# Patient Record
Sex: MALE | Race: WHITE | NOT HISPANIC OR LATINO | Employment: OTHER | URBAN - METROPOLITAN AREA
[De-identification: names, ages, dates, MRNs, and addresses within clinical notes are randomized per-mention and may not be internally consistent; named-entity substitution may affect disease eponyms.]

---

## 2021-01-30 ENCOUNTER — APPOINTMENT (OUTPATIENT)
Dept: RADIOLOGY | Facility: CLINIC | Age: 41
End: 2021-01-30
Payer: COMMERCIAL

## 2021-01-30 ENCOUNTER — OFFICE VISIT (OUTPATIENT)
Dept: URGENT CARE | Facility: CLINIC | Age: 41
End: 2021-01-30
Payer: COMMERCIAL

## 2021-01-30 VITALS
HEIGHT: 67 IN | OXYGEN SATURATION: 99 % | RESPIRATION RATE: 18 BRPM | BODY MASS INDEX: 21.97 KG/M2 | TEMPERATURE: 97.2 F | SYSTOLIC BLOOD PRESSURE: 130 MMHG | WEIGHT: 140 LBS | DIASTOLIC BLOOD PRESSURE: 78 MMHG | HEART RATE: 56 BPM

## 2021-01-30 DIAGNOSIS — S69.92XA INJURY OF LEFT THUMB, INITIAL ENCOUNTER: ICD-10-CM

## 2021-01-30 DIAGNOSIS — S62.515A CLOSED NONDISPLACED FRACTURE OF PROXIMAL PHALANX OF LEFT THUMB, INITIAL ENCOUNTER: Primary | ICD-10-CM

## 2021-01-30 PROCEDURE — 99202 OFFICE O/P NEW SF 15 MIN: CPT | Performed by: PHYSICIAN ASSISTANT

## 2021-01-30 PROCEDURE — 29125 APPL SHORT ARM SPLINT STATIC: CPT | Performed by: PHYSICIAN ASSISTANT

## 2021-01-30 PROCEDURE — 73140 X-RAY EXAM OF FINGER(S): CPT

## 2021-01-30 NOTE — PATIENT INSTRUCTIONS
Thumb Fracture   WHAT YOU NEED TO KNOW:   A thumb fracture is a break in a bone in your thumb  DISCHARGE INSTRUCTIONS:   Return to the emergency department if:   · Your cast cracks or breaks  · You are not able to move your fingers  · You have severe pain in your thumb or hand  · You have new or increased swelling in your thumb or hand  · Your cast feels too tight  · Your injured thumb is numb  · Your skin under the cast or splint burns or stings  Call your doctor or hand specialist if:   · The skin around your cast becomes red and sore  · The skin under your cast or splint itches, and the itch does not go away  · You have questions or concerns about your condition or care  Medicines:   · Prescription pain medicine  may be given  Ask your healthcare provider how to take this medicine safely  Some prescription pain medicines contain acetaminophen  Do not take other medicines that contain acetaminophen without talking to your healthcare provider  Too much acetaminophen may cause liver damage  Prescription pain medicine may cause constipation  Ask your healthcare provider how to prevent or treat constipation  · Take your medicine as directed  Contact your healthcare provider if you think your medicine is not helping or if you have side effects  Tell him or her if you are allergic to any medicine  Keep a list of the medicines, vitamins, and herbs you take  Include the amounts, and when and why you take them  Bring the list or the pill bottles to follow-up visits  Carry your medicine list with you in case of an emergency  Self-care:   · Apply ice on your thumb  to help decrease swelling and pain  Ice may also help prevent tissue damage  Use an ice pack, or put crushed ice in a plastic bag  Cover it with a towel before you place it on your thumb, splint, or cast  Apply ice for 15 to 20 minutes every hour, or as directed      · Elevate your thumb  above the level of your heart as often as you can  This will help decrease swelling and pain  Prop your hand on pillows or blankets to keep your thumb elevated comfortably  · Check the skin around your cast or splint daily  for red or sore areas  · Go to a hand therapist,  if recommended  Your healthcare provider may recommend this after your cast or splint is removed  A hand therapist can help you with exercises to strengthen your hand and help restore movement  Cast or splint care:   · Keep the cast or splint dry  Cover as directed when you need to shower  · Do not stick objects inside to scratch an itch  · Do not pull the padding out  · Keep dirt, sand, and powder out  Follow up with your doctor or hand specialist as directed: You may need x-rays of your thumb to check the position as it heals  Your cast may need to be removed after 2 to 3 weeks to check any wounds  Write down your questions so you remember to ask them during your visits  © Copyright [address] Information is for End User's use only and may not be sold, redistributed or otherwise used for commercial purposes  All illustrations and images included in CareNotes® are the copyrighted property of A D A M , Inc  or [facility] Venita Romo   The above information is an  only  It is not intended as medical advice for individual conditions or treatments  Talk to your doctor, nurse or pharmacist before following any medical regimen to see if it is safe and effective for you

## 2021-01-30 NOTE — PROGRESS NOTES
[facility]        NAME: Elsie Gilman is a 38 y o  male  : 1980    MRN: [id number]  DATE: 2021  TIME: 11:19 AM    Assessment and Plan   Closed nondisplaced fracture of proximal phalanx of left thumb, initial encounter [[id number]]  1  Closed nondisplaced fracture of proximal phalanx of left thumb, initial encounter  Ambulatory referral to Orthopedic Surgery   2  Injury of left thumb, initial encounter  XR thumb left first digit-min 2v         Patient Instructions       Follow up with PCP in 3-5 days  Proceed to  ER if symptoms worsen  Chief Complaint     Chief Complaint   Patient presents with    Thumb Pain     left x 1 day         History of Present Illness       Patient injured his left thumb when he went to left upper frame fell back down injuring his left thumb  He has lot of pain swelling and ecchymosis over the proximal phalanx  Has limited movement of motion at the interphalangeal joint  He denies any numbness or tingling  Review of Systems   Review of Systems   Constitutional: Negative for chills and fever  Musculoskeletal: Positive for arthralgias  Neurological: Negative for numbness  Current Medications     No current outpatient medications on file  Current Allergies     Allergies as of 2021    (No Known Allergies)            The following portions of the patient's history were reviewed and updated as appropriate: allergies, current medications, past family history, past medical history, past social history, past surgical history and problem list      History reviewed  No pertinent past medical history  History reviewed  No pertinent surgical history  History reviewed  No pertinent family history  Medications have been verified  Objective   /78   Pulse 56   Temp (!) 97 2 °F (36 2 °C)   Resp 18   Ht 5' 7" (1 702 m)   Wt 63 5 kg (140 lb)   SpO2 99%   BMI 21 93 kg/m²   No LMP for male patient           Physical Exam     Physical Exam  Vitals signs and nursing note reviewed  Constitutional:       Appearance: Normal appearance  HENT:      Head: Normocephalic and atraumatic  Cardiovascular:      Rate and Rhythm: Normal rate and regular rhythm  Pulmonary:      Effort: Pulmonary effort is normal    Musculoskeletal:      Comments: Left with swelling and ecchymosis  There is tenderness over the proximal phalanx  Diminished range of motion of the interphalangeal joint  Capillary refill less than 2 seconds  Sensation intact to light touch  Skin:     General: Skin is warm  Neurological:      Mental Status: He is alert  Thumb x-ray - oblique fracture proximal phalanx  Orthopedic injury treatment    Date/Time: 1/30/2021 11:18 AM  Performed by: Delroy Harman PA-C  Authorized by: Delroy Harman PA-C     Patient location: care now    Consent given by:  Patient  Patient states understanding of procedure being performed: Yes    Patient identity confirmed:  Verbally with patient  Injury location:  Finger  Location details:  Left thumb  Injury type:  Fracture  Fracture type: proximal phalanx    MCP joint involved?: No    Any IP joint involved?: No    Neurovascular status: Neurovascularly intact    Distal perfusion: normal    Neurological function: normal    Range of motion: reduced    Immobilization:  Splint  Splint type:  Thumb spica  Supplies used:  Ortho-Glass  Neurovascular status: Neurovascularly intact    Distal perfusion: normal    Patient tolerance:  Patient tolerated the procedure well with no immediate complications

## 2021-02-05 ENCOUNTER — EVALUATION (OUTPATIENT)
Dept: OCCUPATIONAL THERAPY | Facility: CLINIC | Age: 41
End: 2021-02-05
Payer: COMMERCIAL

## 2021-02-05 ENCOUNTER — APPOINTMENT (OUTPATIENT)
Dept: RADIOLOGY | Facility: CLINIC | Age: 41
End: 2021-02-05
Payer: COMMERCIAL

## 2021-02-05 VITALS
DIASTOLIC BLOOD PRESSURE: 78 MMHG | HEART RATE: 69 BPM | BODY MASS INDEX: 21.97 KG/M2 | SYSTOLIC BLOOD PRESSURE: 125 MMHG | HEIGHT: 67 IN | WEIGHT: 140 LBS

## 2021-02-05 DIAGNOSIS — S62.515A CLOSED NONDISPLACED FRACTURE OF PROXIMAL PHALANX OF LEFT THUMB, INITIAL ENCOUNTER: Primary | ICD-10-CM

## 2021-02-05 DIAGNOSIS — S62.515A CLOSED NONDISPLACED FRACTURE OF PROXIMAL PHALANX OF LEFT THUMB, INITIAL ENCOUNTER: ICD-10-CM

## 2021-02-05 PROCEDURE — L3913 HFO W/O JOINTS CF: HCPCS

## 2021-02-05 PROCEDURE — 99203 OFFICE O/P NEW LOW 30 MIN: CPT | Performed by: ORTHOPAEDIC SURGERY

## 2021-02-05 PROCEDURE — 26720 TREAT FINGER FRACTURE EACH: CPT | Performed by: ORTHOPAEDIC SURGERY

## 2021-02-05 PROCEDURE — 73140 X-RAY EXAM OF FINGER(S): CPT

## 2021-02-05 NOTE — PROGRESS NOTES
Orthosis    Diagnosis:   1  Closed nondisplaced fracture of proximal phalanx of left thumb, initial encounter  Ambulatory referral to PT/OT hand therapy     Indication: Fracture    Location: Left  thumb  Supplies: Custom Fit Orthotic  Orthosis type: Hand-Based SPICA  Wearing Schedule: Remove for hygiene only  Describe Position: Thumb Abducted to 30 degrees and splint fabricated past IPJ  Threapist used thinner splint material due to patient sensitivy when attempting thicker material  Patient inquired about using original splint from hospital  Patient educated on use of hand based splint as compared to temporary forearm based cast  Patient preferred lighter splint material as compared to thicker material  Patient reports a comfort of fit at end of session  Precautions: Fracture and Universal (skin contact/breakdown)    Patient or Caregiver expresses understanding of wearing Schedule and Precautions? Yes  Patient or Caregiver able to don/doff orthotic independently? Yes    Written orders provided to patient?  Yes  Orders Obtained: Written  Orders Obtained from: Dr Jas Almanzar    Return for evaluation and treatment No

## 2021-02-05 NOTE — PROGRESS NOTES
CHIEF COMPLAINT:  Chief Complaint   Patient presents with    Left Thumb - Pain       SUBJECTIVE:  Danita Daniels is a 38y o  year old LHD male who presents to the office today for a left thumb injury  [facility] on 01/30/2021, he was building a house when he injured his left thumb  He states that thinks he pinched the thumb on a piece of supplies but in unsure  He presented to care now following the injury, at which time x-ray's were performed and he was placed into a thumb spica splint  [facility] denies any significant pain today  PAST MEDICAL HISTORY:  History reviewed  No pertinent past medical history  PAST SURGICAL HISTORY:  Past Surgical History:   Procedure Laterality Date    ELBOW SURGERY      HERNIA REPAIR         FAMILY HISTORY:  History reviewed  No pertinent family history  SOCIAL HISTORY:  Social History     Tobacco Use    Smoking status: Never Smoker    Smokeless tobacco: Never Used   Substance Use Topics    Alcohol use: Never     Frequency: Never    Drug use: Never       MEDICATIONS:  No current outpatient medications on file  ALLERGIES:  No Known Allergies    REVIEW OF SYSTEMS:  Review of Systems   Constitutional: Negative for chills, fever and unexpected weight change  HENT: Negative for hearing loss, nosebleeds and sore throat  Eyes: Negative for pain, redness and visual disturbance  Respiratory: Negative for cough, shortness of breath and wheezing  Cardiovascular: Negative for chest pain, palpitations and leg swelling  Gastrointestinal: Negative for abdominal pain, nausea and vomiting  Endocrine: Negative for polydipsia and polyuria  Genitourinary: Negative for difficulty urinating and hematuria  Musculoskeletal: Negative for arthralgias, joint swelling and myalgias  Skin: Negative for rash and wound  Neurological: Negative for dizziness, numbness and headaches     Psychiatric/Behavioral: Negative for decreased concentration, dysphoric mood and suicidal ideas  The patient is not nervous/anxious  VITALS:  Vitals:    02/05/21 1018   BP: 125/78   Pulse: 69       LABS:  HgA1c: No results found for: HGBA1C  BMP: No results found for: GLUCOSE, CALCIUM, NA, K, CO2, CL, BUN, CREATININE    _____________________________________________________  PHYSICAL EXAMINATION:  General: well developed and well nourished, alert, oriented times 3 and appears comfortable  Psychiatric: Normal  HEENT: Trachea Midline, No torticollis  Pulmonary: No audible wheezing or strider  Cardiovascular: No discernable arrhythmia   Skin: No masses, erythema, lacerations, fluctation, ulcerations  Neurovascular: Sensation Intact to the Median, Ulnar, Radial Nerve, Motor Intact to the Median, Ulnar, Radial Nerve and Pulses Intact    MUSCULOSKELETAL EXAMINATION:    Left thumb:     No erythema   Mild ecchymosis   Mild edema   Good IP and MCP flexion   Full extension     ___________________________________________________  STUDIES REVIEWED:  Images obtained on 01/30/2021 of the left thumb 2 views demonstrate transverse proximal phalanx fracture, nondisplaced  Small metallic foreign body in the soft tissue adjacent to the distal phalanx of the thumb measuring 3mm  Images obtained in the office today of the left thumb 2 views demonstrate no interval displacement of proximal phalanx fracture         PROCEDURES PERFORMED:  Fracture / Dislocation Treatment    Date/Time: 2/5/2021 10:39 AM  Performed by: Ryne Ulloa MD  Authorized by: Ryne Ulloa MD     Patient Location:  Clinic  Other Assisting Provider: No    Verbal consent obtained?: Yes    Written consent obtained?: No    Risks and benefits: Risks, benefits and alternatives were discussed    Consent given by:  Patient  Time out: Immediately prior to the procedure a time out was called    Injury location:  Finger  Location details:  Left thumb  Injury type:  Fracture  Fracture type: proximal phalanx    MCP joint involved?: No    Any IP joint involved?: No    Neurovascular status: Neurovascularly intact    Manipulation performed?: No    Immobilization:  Splint  Splint type: thumb spica  Supplies used: OT fabrication  Neurovascular status: Neurovascularly intact    Patient tolerance:  Patient tolerated the procedure well with no immediate complications         _____________________________________________________  ASSESSMENT/PLAN:    * Left thumb proximal phalanx fracture, nondisplaced, DOI 01/30/2021   * Thumb spica splint was removed today and repeat x-ray's were performed   * OT ordered for hand based thumb spica splint, past the tip of the thumb   * Splint may be removed for hygiene purposes only   * Follow up in 2 weeks time with repeat left thumb x-ray's out of the splint     Follow Up:  Return in about 2 weeks (around 2/19/2021)  To Do Next Visit:  Re-evaluation of current issue, X-rays of the  left  thumb and Cast/splint off prior to x-ray    General Discussions:  Fracture - Nonoperative Care: The physiology of a fractured bone was discussed with the patient today  With non-displaced or minimally displaced fractures, conservative treatment such as casting or splinting often results in a functional recovery  Typically, these fractures are immobilized in either a cast or splint depending on the pattern  Radiographs are typically taken at intervals throughout the fracture healing to ensure that reduction or alignment is not lost   If the fracture loses its alignment, surgical intervention may be required to stabilize it  Medical conditions such as diabetes, osteoporosis, vitamin D deficiency, and a history of or exposure to smoking may delay or prevent fracture healing  Options between cast/splint immobilization and surgical treatment were offered and the risks and benefits of both were discussed         Scribe Attestation    I,:  Chapin Tucker am acting as a scribe while in the presence of the attending physician :       I,:  Gianfranco Melendez Marissa Kellogg MD personally performed the services described in this documentation    as scribed in my presence :

## 2021-02-19 ENCOUNTER — APPOINTMENT (OUTPATIENT)
Dept: RADIOLOGY | Facility: CLINIC | Age: 41
End: 2021-02-19
Payer: COMMERCIAL

## 2021-02-19 VITALS
WEIGHT: 140 LBS | DIASTOLIC BLOOD PRESSURE: 73 MMHG | SYSTOLIC BLOOD PRESSURE: 127 MMHG | BODY MASS INDEX: 21.97 KG/M2 | HEIGHT: 67 IN | HEART RATE: 70 BPM

## 2021-02-19 DIAGNOSIS — S62.515A CLOSED NONDISPLACED FRACTURE OF PROXIMAL PHALANX OF LEFT THUMB, INITIAL ENCOUNTER: ICD-10-CM

## 2021-02-19 DIAGNOSIS — S62.515A CLOSED NONDISPLACED FRACTURE OF PROXIMAL PHALANX OF LEFT THUMB, INITIAL ENCOUNTER: Primary | ICD-10-CM

## 2021-02-19 PROCEDURE — 99024 POSTOP FOLLOW-UP VISIT: CPT | Performed by: ORTHOPAEDIC SURGERY

## 2021-02-19 PROCEDURE — 73140 X-RAY EXAM OF FINGER(S): CPT

## 2021-02-19 NOTE — PROGRESS NOTES
CHIEF COMPLAINT:  Chief Complaint   Patient presents with    Left Thumb - Follow-up       SUBJECTIVE:  Harvest Koyanagi is a 38y o  year old LHD male who presents   To the office for a follow-up for left thumb  Nondisplaced proximal phalanx fracture sustained 01/30/2021  Patient has been wearing his thumb spica hand based splint as advised  Patient states that he has a lot of stiffness and some continued swelling in his left thumb  PAST MEDICAL HISTORY:  History reviewed  No pertinent past medical history  PAST SURGICAL HISTORY:  Past Surgical History:   Procedure Laterality Date    ELBOW SURGERY      HERNIA REPAIR         FAMILY HISTORY:  History reviewed  No pertinent family history  SOCIAL HISTORY:  Social History     Tobacco Use    Smoking status: Never Smoker    Smokeless tobacco: Never Used   Substance Use Topics    Alcohol use: Never     Frequency: Never    Drug use: Never       MEDICATIONS:  No current outpatient medications on file  ALLERGIES:  No Known Allergies    REVIEW OF SYSTEMS:  Review of Systems   Constitutional: Negative for chills, fever and unexpected weight change  HENT: Negative for hearing loss, nosebleeds and sore throat  Eyes: Negative for pain, redness and visual disturbance  Respiratory: Negative for cough, shortness of breath and wheezing  Cardiovascular: Negative for chest pain, palpitations and leg swelling  Gastrointestinal: Negative for abdominal pain, nausea and vomiting  Endocrine: Negative for polydipsia and polyuria  Genitourinary: Negative for dysuria and hematuria  Skin: Negative for rash and wound  Neurological: Negative for dizziness, light-headedness and headaches  Psychiatric/Behavioral: Negative for decreased concentration, dysphoric mood and suicidal ideas  The patient is not nervous/anxious          VITALS:  Vitals:    02/19/21 0957   BP: 127/73   Pulse: 70       LABS:  HgA1c: No results found for: HGBA1C  BMP: No results found for: GLUCOSE, CALCIUM, NA, K, CO2, CL, BUN, CREATININE    _____________________________________________________  PHYSICAL EXAMINATION:  General: well developed and well nourished, alert, oriented times 3 and appears comfortable  Psychiatric: Normal  HEENT: Trachea Midline, No torticollis  Pulmonary: No audible wheezing or strider  Cardiovascular: No discernable arrhythmia   Skin: No masses, erythema, lacerations, fluctation, ulcerations  Neurovascular: Sensation Intact to the Median, Ulnar, Radial Nerve, Motor Intact to the Median, Ulnar, Radial Nerve and Pulses Intact    MUSCULOSKELETAL EXAMINATION:  Left thumb   FDS FDP intact  Some fullness of left thumb noted   Limited motion at the IP joint       ___________________________________________________  STUDIES REVIEWED:    X-rays of the left thumb performed today show maintained alignment of proximal phalanx fracture      PROCEDURES PERFORMED:  Procedures  No Procedures performed today    _____________________________________________________  ASSESSMENT/PLAN:    Left thumb proximal phalanx fracture, nondisplaced, DOI 01/30/2021   * Pt was advised that his fracture is not yet healed   *  Patient was advised to maintain brace but may removed to work on active motion  * no passive motion and no heavy lifting       Follow Up:  No follow-ups on file          To Do Next Visit:  Re-evaluation of current issue      Scribe Attestation    I,:  Ollie Ortega am acting as a scribe while in the presence of the attending physician :       I,:  Chucho Maier MD personally performed the services described in this documentation    as scribed in my presence :